# Patient Record
Sex: FEMALE | Race: WHITE | Employment: PART TIME | URBAN - NONMETROPOLITAN AREA
[De-identification: names, ages, dates, MRNs, and addresses within clinical notes are randomized per-mention and may not be internally consistent; named-entity substitution may affect disease eponyms.]

---
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
## 2020-07-13 ENCOUNTER — VIRTUAL VISIT (OUTPATIENT)
Dept: FAMILY MEDICINE | Facility: OTHER | Age: 20
End: 2020-07-13
Attending: PHYSICIAN ASSISTANT
Payer: COMMERCIAL

## 2020-07-13 DIAGNOSIS — J02.0 STREPTOCOCCAL SORE THROAT: ICD-10-CM

## 2020-07-13 DIAGNOSIS — R51.9 ACUTE NONINTRACTABLE HEADACHE, UNSPECIFIED HEADACHE TYPE: ICD-10-CM

## 2020-07-13 DIAGNOSIS — Z20.822 EXPOSURE TO COVID-19 VIRUS: Primary | ICD-10-CM

## 2020-07-13 DIAGNOSIS — Z20.822 SUSPECTED COVID-19 VIRUS INFECTION: Primary | ICD-10-CM

## 2020-07-13 PROCEDURE — 99213 OFFICE O/P EST LOW 20 MIN: CPT | Mod: TEL | Performed by: PHYSICIAN ASSISTANT

## 2020-07-13 PROCEDURE — U0003 INFECTIOUS AGENT DETECTION BY NUCLEIC ACID (DNA OR RNA); SEVERE ACUTE RESPIRATORY SYNDROME CORONAVIRUS 2 (SARS-COV-2) (CORONAVIRUS DISEASE [COVID-19]), AMPLIFIED PROBE TECHNIQUE, MAKING USE OF HIGH THROUGHPUT TECHNOLOGIES AS DESCRIBED BY CMS-2020-01-R: HCPCS | Mod: ZL | Performed by: PHYSICIAN ASSISTANT

## 2020-07-13 PROCEDURE — 99207 ZZC NO CHARGE NURSE ONLY: CPT

## 2020-07-13 PROCEDURE — C9803 HOPD COVID-19 SPEC COLLECT: HCPCS

## 2020-07-13 NOTE — PATIENT INSTRUCTIONS
"Discharge Instructions for COVID-19 Patients  You have--or may have--COVID-19. Please follow the instructions listed below.   If you have a weakened immune system, discuss with your doctor any other actions you need to take.  How can I protect others?  If you have symptoms (fever, cough, body aches or trouble breathing):    Stay home and away from others (self-isolate) until:  ? At least 10 days have passed since your symptoms started. And   ? You've had no fever--and no medicine that reduces fever--for 3 full days (72 hours). And   ? Your other symptoms have resolved (gotten better).  If you don't show symptoms, but testing showed that you have COVID-19:    Stay home and away from others (self-isolate) until at least 10 days have passed since the date of your first positive COVID-19 test.  During this time    Stay in your own room, even for meals. Use your own bathroom if you can.    Stay away from others in your home. No hugging, kissing or shaking hands. No visitors.    Don't go to work, school or anywhere else.    Clean \"high touch\" surfaces often (doorknobs, counters, handles). Use household cleaning spray or wipes. You'll find a full list of  on the EPA website: www.epa.gov/pesticide-registration/list-n-disinfectants-use-against-sars-cov-2.    Cover your mouth and nose with a mask, tissue or wash cloth to avoid spreading germs.    Wash your hands and face often. Use soap and water.    Caregivers in these groups are at risk for severe illness due to COVID-19:  ? People 65 years and older  ? People who live in a nursing home or long-term care facility  ? People with chronic disease (lung, heart, cancer, diabetes, kidney, liver, immunologic)  ? People who have a weakened immune system, including those who:    Are in cancer treatment    Take medicine that weakens the immune system, such as corticosteroids    Had a bone marrow or organ transplant    Have an immune deficiency    Have poorly controlled HIV or " AIDS    Are obese (body mass index of 40 or higher)    Smoke regularly    Caregivers should wear gloves while washing dishes, handling laundry and cleaning bedrooms and bathrooms.    Use caution when washing and drying laundry: Don't shake dirty laundry and use the warmest water setting that you can.    For more tips on managing your health at home, go to www.cdc.gov/coronavirus/2019-ncov/downloads/10Things.pdf.  How can I take care of myself at home?  1. Get lots of rest. Drink extra fluids (unless a doctor has told you not to).  2. Take Tylenol (acetaminophen) for fever or pain. If you have liver or kidney problems, ask your family doctor if it's okay to take Tylenol.     Adults can take either:  ? 650 mg (two 325 mg pills) every 4 to 6 hours, or   ? 1,000 mg (two 500 mg pills) every 8 hours as needed.  ? Note: Don't take more than 3,000 mg in one day. Acetaminophen is found in many medicines (both prescribed and over-the-counter medicines). Read all labels to be sure you don't take too much.   For children, check the Tylenol bottle for the right dose. The dose is based on the child's age or weight.  3. If you have other health problems (like cancer, heart failure, an organ transplant or severe kidney disease): Call your specialty clinic if you don't feel better in the next 2 days.  4. Know when to call 911. Emergency warning signs include:  ? Trouble breathing or shortness of breath  ? Pain or pressure in the chest that doesn't go away  ? Feeling confused like you haven't felt before, or not being able to wake up  ? Bluish-colored lips or face  5. Your doctor may have prescribed a blood thinner medicine. Follow their instructions.  Where can I get more information?     [employer] [place] - About COVID-19:   [email or web address]    CDC - What to Do If You're Sick: www.cdc.gov/coronavirus/2019-ncov/about/steps-when-sick.html    CDC - Ending Home Isolation:  www.cdc.gov/coronavirus/2019-ncov/hcp/disposition-in-home-patients.html    CDC - Caring for Someone: www.cdc.gov/coronavirus/2019-ncov/if-you-are-sick/care-for-someone.html    [facility] - Interim Guidance for Hospital Discharge to Home: www.[facility].[place]/diseases/coronavirus/hcp/hospdischarge.pdf    [facility] clinical trials (COVID-19 research studies): clinicalaffairs.[facility]-clinical-trials    Below are the COVID-19 hotlines at the [place] Department of Health ([facility]). Interpreters are available.  ? For health questions: Call 271-446-3859 or 1-402.820.8752 (7 a.m. to 7 p.m.)  ? For questions about schools and childcare: Call 817-250-1357 or 1-714.706.6276 (7 a.m. to 7 p.m.)    For informational purposes only. Not to replace the advice of your health care provider. Clinically reviewed by the Infection Prevention Team.Copyright   2020 [facility]. All rights reserved. [place] [id number] - 06/20.

## 2020-07-13 NOTE — NURSING NOTE
Patient was around a person on Tuesday that was positive for COVID.  Bita Rosado LPN ....................  7/13/2020   12:21 PM

## 2020-07-13 NOTE — PROGRESS NOTES
"Rip Lyon is a 20 year old female who is being evaluated via a billable telephone visit.      The patient has been notified of following:     \"This telephone visit will be conducted via a call between you and your physician/provider. We have found that certain health care needs can be provided without the need for a physical exam.  This service lets us provide the care you need with a short phone conversation.  If a prescription is necessary we can send it directly to your pharmacy.  If lab work is needed we can place an order for that and you can then stop by our lab to have the test done at a later time.    Telephone visits are billed at different rates depending on your insurance coverage. During this emergency period, for some insurers they may be billed the same as an in-person visit.  Please reach out to your insurance provider with any questions.    If during the course of the call the physician/provider feels a telephone visit is not appropriate, you will not be charged for this service.\"    Patient has given verbal consent for Telephone visit?  Yes    What phone number would you like to be contacted at? [phone]    How would you like to obtain your AVS? Mail a copy    Subjective     Rip Lyon is a 20 year old female who presents via phone visit today for the following health issues:    HPI  Patient is contacted via telephone for consideration of COVID-19 testing. Patient states she was in close contact with a friend for an extended period of time earlier this last week. She then developed   a headache and sore throat Wednesday. She has not been taking anything for symptomatic relief. No history of pulmonary disease. Non smoker.  Denies fever, chills, sweats, cough, shortness of breath, wheezing, muscle or joint aches, GI symptoms, rash.     PAST MEDICAL HISTORY:   Past Medical History:   Diagnosis Date     Otitis media     2001,2006,2007, with otorrhea, treated with azithromycin, with " amoxicillin.       PAST SURGICAL HISTORY:   Past Surgical History:   Procedure Laterality Date     MYRINGOTOMY, INSERT TUBE, COMBINED      No Comments Provided       FAMILY HISTORY: History reviewed. No pertinent family history.    SOCIAL HISTORY:   Social History     Tobacco Use     Smoking status: Never Smoker     Smokeless tobacco: Never Used   Substance Use Topics     Alcohol use: No      No Known Allergies  No current outpatient medications on file.     No current facility-administered medications for this visit.          Reviewed and updated as needed this visit by Provider  Tobacco  Allergies  Meds  Problems  Med Hx  Surg Hx  Fam Hx         Review of Systems   Constitutional, HEENT, cardiovascular, pulmonary, gi and gu systems are negative, except as otherwise noted.       Objective   Reported vitals:  There were no vitals taken for this visit.   healthy, alert and no distress  PSYCH: Alert and oriented times 3; coherent speech, normal   rate and volume, able to articulate logical thoughts, able   to abstract reason, no tangential thoughts, no hallucinations   or delusions  Her affect is normal  RESP: No cough, no audible wheezing, able to talk in full sentences  Remainder of exam unable to be completed due to telephone visits            Assessment/Plan:  1. Exposure to COVID-19 virus    2. Acute nonintractable headache, unspecified headache type    3. Streptococcal sore throat      Patient meets criteria for COVID-19 testing. They are informed to self quarantine until results are available. They have been provided information to complete curbside testing. Will notify with results. If positive, patient is to self-quarantine at home for 14 days. If symptoms worsen, they were told they need to have further re-evaluation. Encouraged symptomatic management.       Phone call duration:  5 minutes    Angelique Crooks PA-C

## 2020-07-14 LAB
SARS-COV-2 RNA SPEC QL NAA+PROBE: NOT DETECTED
SPECIMEN SOURCE: NORMAL

## 2021-05-13 ENCOUNTER — PATIENT OUTREACH (OUTPATIENT)
Dept: FAMILY MEDICINE | Facility: OTHER | Age: 21
End: 2021-05-13

## 2021-05-13 NOTE — LETTER
[facility]  [address]  [place] [id number] [phone]       May 13, 2021    Rip Lyon  [address]  [place] [address]    Dear Rip,    We care about your health and have reviewed your health plan and are making recommendations based on this review, to optimize your health.    You are in particular need of attention regarding:  -Cervical Cancer Screening    We are recommending that you:  -schedule a PAP SMEAR EXAM which is due.  Please disregard this reminder if you have had this exam elsewhere within the last year.  It would be helpful for us to have a copy of your recent pap smear report in our file so that we can best coordinate your care.    If you are under/uninsured, we recommend you contact the [employer] [facility]. They offer pap smears at no charge or on a sliding fee charge. You can schedule with them at [phone]. Please have them send us the results.      In addition, here is a list of due or overdue Health Maintenance reminders.    Health Maintenance Due   Topic Date Due     Preventive Care Visit  Never done     Chlamydia Screening  Never done     Discuss Advance Care Planning  Never done     Diptheria Tetanus Pertussis (DTAP/TDAP/TD) Vaccine (1 - Tdap) Never done     HPV Vaccine (1 - 2-dose series) Never done     HIV Screening  Never done     COVID-19 Vaccine (1) Never done     Hepatitis C Screening  Never done     PHQ-2  01/01/2021     PAP Smear  04/21/2021       To address the above recommendations, we encourage you to contact us at [phone], via [employer]nb or by contacting Central Scheduling toll free at [phone] 24 hours a day. They will assist you with finding the most convenient time and location.    Thank you for trusting [facility] and we appreciate the opportunity to serve you.  We look forward to supporting your healthcare needs in the future.    Healthy Regards,    Your [facility] Team

## 2021-05-13 NOTE — TELEPHONE ENCOUNTER
Patient Quality Outreach      Summary:    Patient has the following on her problem list/HM: None    Patient is due/failing the following:   Cervical Cancer Screening - PAP Needed    Type of outreach:    Sent letter.    Questions for provider review:    None                                                                                                                                          Chart routed to Care Team.

## 2021-07-06 ENCOUNTER — HOSPITAL ENCOUNTER (EMERGENCY)
Facility: OTHER | Age: 21
Discharge: HOME | End: 2021-07-06
Attending: PHYSICIAN ASSISTANT | Admitting: PHYSICIAN ASSISTANT
Payer: COMMERCIAL

## 2021-07-06 VITALS
WEIGHT: 166 LBS | SYSTOLIC BLOOD PRESSURE: 115 MMHG | TEMPERATURE: 98.2 F | BODY MASS INDEX: 25.43 KG/M2 | RESPIRATION RATE: 16 BRPM | OXYGEN SATURATION: 100 % | DIASTOLIC BLOOD PRESSURE: 73 MMHG | HEART RATE: 80 BPM

## 2021-07-06 DIAGNOSIS — R19.5 LOOSE STOOLS: ICD-10-CM

## 2021-07-06 DIAGNOSIS — F41.1 ANXIETY REACTION: ICD-10-CM

## 2021-07-06 DIAGNOSIS — E83.42 HYPOMAGNESEMIA: ICD-10-CM

## 2021-07-06 LAB
ALBUMIN SERPL-MCNC: 4.7 G/DL (ref 3.5–5.7)
ALBUMIN UR-MCNC: NEGATIVE MG/DL
ALP SERPL-CCNC: 51 U/L (ref 34–104)
ALT SERPL W P-5'-P-CCNC: 11 U/L (ref 7–52)
ANION GAP SERPL CALCULATED.3IONS-SCNC: 10 MMOL/L (ref 3–14)
APPEARANCE UR: CLEAR
AST SERPL W P-5'-P-CCNC: 15 U/L (ref 13–39)
BACTERIA #/AREA URNS HPF: ABNORMAL /HPF
BASOPHILS # BLD AUTO: 0 10E9/L (ref 0–0.2)
BASOPHILS NFR BLD AUTO: 0.8 %
BILIRUB SERPL-MCNC: 0.6 MG/DL (ref 0.3–1)
BILIRUB UR QL STRIP: NEGATIVE
BUN SERPL-MCNC: 10 MG/DL (ref 7–25)
CALCIUM SERPL-MCNC: 9.5 MG/DL (ref 8.6–10.3)
CHLORIDE SERPL-SCNC: 106 MMOL/L (ref 98–107)
CO2 SERPL-SCNC: 24 MMOL/L (ref 21–31)
COLOR UR AUTO: YELLOW
CREAT SERPL-MCNC: 0.86 MG/DL (ref 0.6–1.2)
D DIMER PPP FEU-MCNC: 0.4 UG/ML FEU (ref 0–0.5)
DIFFERENTIAL METHOD BLD: NORMAL
EOSINOPHIL # BLD AUTO: 0.1 10E9/L (ref 0–0.7)
EOSINOPHIL NFR BLD AUTO: 2.7 %
ERYTHROCYTE [DISTWIDTH] IN BLOOD BY AUTOMATED COUNT: 12.6 % (ref 10–15)
GFR SERPL CREATININE-BSD FRML MDRD: 83 ML/MIN/{1.73_M2}
GLUCOSE SERPL-MCNC: 114 MG/DL (ref 70–105)
GLUCOSE UR STRIP-MCNC: NEGATIVE MG/DL
HCG UR QL: NEGATIVE
HCT VFR BLD AUTO: 42.3 % (ref 35–47)
HGB BLD-MCNC: 14.1 G/DL (ref 11.7–15.7)
HGB UR QL STRIP: ABNORMAL
IMM GRANULOCYTES # BLD: 0 10E9/L (ref 0–0.4)
IMM GRANULOCYTES NFR BLD: 0.4 %
KETONES UR STRIP-MCNC: 5 MG/DL
LEUKOCYTE ESTERASE UR QL STRIP: NEGATIVE
LYMPHOCYTES # BLD AUTO: 1 10E9/L (ref 0.8–5.3)
LYMPHOCYTES NFR BLD AUTO: 20.3 %
MAGNESIUM SERPL-MCNC: 1.9 MG/DL (ref 1.9–2.7)
MCH RBC QN AUTO: 29.7 PG (ref 26.5–33)
MCHC RBC AUTO-ENTMCNC: 33.3 G/DL (ref 31.5–36.5)
MCV RBC AUTO: 89 FL (ref 78–100)
MONOCYTES # BLD AUTO: 0.5 10E9/L (ref 0–1.3)
MONOCYTES NFR BLD AUTO: 10.9 %
NEUTROPHILS # BLD AUTO: 3.1 10E9/L (ref 1.6–8.3)
NEUTROPHILS NFR BLD AUTO: 64.9 %
NITRATE UR QL: NEGATIVE
PH UR STRIP: 7 PH (ref 5–7)
PLATELET # BLD AUTO: 257 10E9/L (ref 150–450)
POTASSIUM SERPL-SCNC: 3.2 MMOL/L (ref 3.5–5.1)
PROT SERPL-MCNC: 7.1 G/DL (ref 6.4–8.9)
RBC # BLD AUTO: 4.74 10E12/L (ref 3.8–5.2)
RBC #/AREA URNS AUTO: 4 /HPF (ref 0–2)
SODIUM SERPL-SCNC: 140 MMOL/L (ref 134–144)
SOURCE: ABNORMAL
SP GR UR STRIP: 1.01 (ref 1–1.03)
TROPONIN I SERPL-MCNC: <2.3 PG/ML
TSH SERPL DL<=0.05 MIU/L-ACNC: 2.14 IU/ML (ref 0.34–5.6)
UROBILINOGEN UR STRIP-MCNC: NORMAL MG/DL (ref 0–2)
WBC # BLD AUTO: 4.8 10E9/L (ref 4–11)
WBC #/AREA URNS AUTO: <1 /HPF (ref 0–5)

## 2021-07-06 PROCEDURE — 84484 ASSAY OF TROPONIN QUANT: CPT | Performed by: PHYSICIAN ASSISTANT

## 2021-07-06 PROCEDURE — 96375 TX/PRO/DX INJ NEW DRUG ADDON: CPT | Performed by: PHYSICIAN ASSISTANT

## 2021-07-06 PROCEDURE — 84443 ASSAY THYROID STIM HORMONE: CPT | Performed by: PHYSICIAN ASSISTANT

## 2021-07-06 PROCEDURE — 250N000013 HC RX MED GY IP 250 OP 250 PS 637: Performed by: PHYSICIAN ASSISTANT

## 2021-07-06 PROCEDURE — 258N000003 HC RX IP 258 OP 636: Performed by: PHYSICIAN ASSISTANT

## 2021-07-06 PROCEDURE — 96361 HYDRATE IV INFUSION ADD-ON: CPT | Performed by: PHYSICIAN ASSISTANT

## 2021-07-06 PROCEDURE — 80053 COMPREHEN METABOLIC PANEL: CPT | Performed by: PHYSICIAN ASSISTANT

## 2021-07-06 PROCEDURE — 93010 ELECTROCARDIOGRAM REPORT: CPT | Performed by: INTERNAL MEDICINE

## 2021-07-06 PROCEDURE — 96374 THER/PROPH/DIAG INJ IV PUSH: CPT | Performed by: PHYSICIAN ASSISTANT

## 2021-07-06 PROCEDURE — 81001 URINALYSIS AUTO W/SCOPE: CPT | Performed by: PHYSICIAN ASSISTANT

## 2021-07-06 PROCEDURE — 85379 FIBRIN DEGRADATION QUANT: CPT | Performed by: PHYSICIAN ASSISTANT

## 2021-07-06 PROCEDURE — 99283 EMERGENCY DEPT VISIT LOW MDM: CPT | Performed by: PHYSICIAN ASSISTANT

## 2021-07-06 PROCEDURE — 85025 COMPLETE CBC W/AUTO DIFF WBC: CPT | Performed by: PHYSICIAN ASSISTANT

## 2021-07-06 PROCEDURE — 99284 EMERGENCY DEPT VISIT MOD MDM: CPT | Mod: 25 | Performed by: PHYSICIAN ASSISTANT

## 2021-07-06 PROCEDURE — 250N000011 HC RX IP 250 OP 636: Performed by: PHYSICIAN ASSISTANT

## 2021-07-06 PROCEDURE — 81025 URINE PREGNANCY TEST: CPT | Performed by: PHYSICIAN ASSISTANT

## 2021-07-06 PROCEDURE — 83735 ASSAY OF MAGNESIUM: CPT | Performed by: PHYSICIAN ASSISTANT

## 2021-07-06 PROCEDURE — 36415 COLL VENOUS BLD VENIPUNCTURE: CPT | Performed by: PHYSICIAN ASSISTANT

## 2021-07-06 PROCEDURE — 93005 ELECTROCARDIOGRAM TRACING: CPT | Performed by: PHYSICIAN ASSISTANT

## 2021-07-06 RX ORDER — LORAZEPAM 2 MG/ML
0.5 INJECTION INTRAMUSCULAR ONCE
Status: COMPLETED | OUTPATIENT
Start: 2021-07-06 | End: 2021-07-06

## 2021-07-06 RX ORDER — ONDANSETRON 2 MG/ML
4 INJECTION INTRAMUSCULAR; INTRAVENOUS EVERY 30 MIN PRN
Status: DISCONTINUED | OUTPATIENT
Start: 2021-07-06 | End: 2021-07-06 | Stop reason: HOSPADM

## 2021-07-06 RX ORDER — MAGNESIUM OXIDE 400 MG/1
800 TABLET ORAL ONCE
Status: COMPLETED | OUTPATIENT
Start: 2021-07-06 | End: 2021-07-06

## 2021-07-06 RX ORDER — SODIUM CHLORIDE 9 MG/ML
INJECTION, SOLUTION INTRAVENOUS CONTINUOUS
Status: DISCONTINUED | OUTPATIENT
Start: 2021-07-06 | End: 2021-07-06 | Stop reason: HOSPADM

## 2021-07-06 RX ADMIN — SODIUM CHLORIDE 1000 ML: 9 INJECTION, SOLUTION INTRAVENOUS at 18:56

## 2021-07-06 RX ADMIN — MAGNESIUM OXIDE TAB 400 MG (241.3 MG ELEMENTAL MG) 800 MG: 400 (241.3 MG) TAB at 20:03

## 2021-07-06 RX ADMIN — ONDANSETRON 4 MG: 2 INJECTION INTRAMUSCULAR; INTRAVENOUS at 18:57

## 2021-07-06 RX ADMIN — LORAZEPAM 0.5 MG: 2 INJECTION, SOLUTION INTRAMUSCULAR; INTRAVENOUS at 18:56

## 2021-07-06 ASSESSMENT — ENCOUNTER SYMPTOMS
DIARRHEA: 1
BRUISES/BLEEDS EASILY: 0
FEVER: 0
SHORTNESS OF BREATH: 0
HEMATURIA: 0
CONFUSION: 0
NERVOUS/ANXIOUS: 1
NAUSEA: 0
ADENOPATHY: 0
ABDOMINAL PAIN: 0
VOMITING: 0
CHEST TIGHTNESS: 0
BACK PAIN: 0
WOUND: 0
CHILLS: 0
CONSTIPATION: 0

## 2021-07-06 NOTE — ED TRIAGE NOTES
ED Nursing Triage Note (General)   ________________________________    Sukhdeepky Lyon is a 21 year old Female that presents to triage via private vehicle with complaints of fatigue.  Patient states earlier today she was feeling weak/fatigued so she took a nap and after she woke up she began having diarrhea as well as a tingling feeling in her hands/knees and a tight feeling in her chest.  Patient denies hx of anxiety or cardiac dx. Patient denies worsening symptoms since onset, however, states symptoms are not resolving.    Significant symptoms had onset 5 hours ago.  GCS-15  Airway: intact  Breathing noted as Normal  Action taken: 4      PRE [facility]IV[facility] SITUATION-home

## 2021-07-07 NOTE — ED PROVIDER NOTES
History     Chief Complaint   Patient presents with     Tingling     Diarrhea     HPI  Rip Lyon is a 21 year old female who reports that she has been feeling fatigued and weak over the past few days.  She went on to take a nap and when she woke up she had significant amount of loose stool.  She denies any anxiety but she reports her hands became tingly and she felt her knees were weak and she had some chest tightness.  She feels most of her symptoms have been gradually improving while waiting in the waiting room.  Denies any other issues at this time.    Allergies:  No Known Allergies    Problem List:    There are no active problems to display for this patient.       Past Medical History:    Past Medical History:   Diagnosis Date     Otitis media        Past Surgical History:    Past Surgical History:   Procedure Laterality Date     MYRINGOTOMY, INSERT TUBE, COMBINED      No Comments Provided       Family History:    No family history on file.    Social History:  Marital Status:  Single [1]  Social History     Tobacco Use     Smoking status: Never Smoker     Smokeless tobacco: Never Used   Substance Use Topics     Alcohol use: No     Drug use: Not Currently     Comment: Drug use: No        Medications:    No current outpatient medications on file.        Review of Systems   Constitutional: Negative for chills and fever.   HENT: Negative for congestion and drooling.    Eyes: Negative for visual disturbance.   Respiratory: Negative for chest tightness and shortness of breath.    Cardiovascular: Negative for chest pain.   Gastrointestinal: Positive for diarrhea. Negative for abdominal pain, constipation, nausea and vomiting.   Genitourinary: Negative for hematuria.   Musculoskeletal: Negative for back pain.   Skin: Negative for rash and wound.   Neurological: Negative for syncope.   Hematological: Negative for adenopathy. Does not bruise/bleed easily.   Psychiatric/Behavioral: Negative for confusion. The  patient is nervous/anxious.    All other systems reviewed and are negative.      Physical Exam   BP: 115/73  Pulse: 80  Temp: 98.2  F (36.8  C)  Resp: 16  Weight: 75.3 kg (166 lb)  SpO2: 100 %      Physical Exam  Vitals signs and nursing note reviewed.   Constitutional:       General: She is not in acute distress.     Appearance: She is well-developed. She is not diaphoretic.   HENT:      Head: Normocephalic and atraumatic.   Eyes:      General: No scleral icterus.     Conjunctiva/sclera: Conjunctivae normal.   Neck:      Musculoskeletal: Neck supple.   Cardiovascular:      Rate and Rhythm: Normal rate and regular rhythm.   Pulmonary:      Effort: Pulmonary effort is normal.      Breath sounds: Normal breath sounds.      Comments: Lung sounds are clear.  SaO2 is 100% on room air.  She is not appear to be in any respiratory distress.  No tachypnea.  Abdominal:      General: Abdomen is flat. Bowel sounds are normal.      Palpations: Abdomen is soft. There is no mass.      Tenderness: There is no abdominal tenderness.      Comments: Abdomen is soft nontender no rebound or masses.  Bowel sounds are normal.   Musculoskeletal:         General: No deformity.   Lymphadenopathy:      Cervical: No cervical adenopathy.   Skin:     General: Skin is warm and dry.      Findings: No rash.   Neurological:      Mental Status: She is alert and oriented to person, place, and time. Mental status is at baseline.   Psychiatric:         Mood and Affect: Mood normal.         Behavior: Behavior normal.         ED Course     EKG shows normal sinus rhythm with sinus arrhythmia heart rate of 72.    Results for orders placed or performed during the hospital encounter of 07/06/21 (from the past 24 hour(s))   CBC with platelets differential   Result Value Ref Range    WBC 4.8 4.0 - 11.0 10e9/L    RBC Count 4.74 3.8 - 5.2 10e12/L    Hemoglobin 14.1 11.7 - 15.7 g/dL    Hematocrit 42.3 35.0 - 47.0 %    MCV 89 78 - 100 fl    MCH 29.7 26.5 - 33.0 pg     MCHC 33.3 31.5 - 36.5 g/dL    RDW 12.6 10.0 - 15.0 %    Platelet Count 257 150 - 450 10e9/L    Diff Method Automated Method     % Neutrophils 64.9 %    % Lymphocytes 20.3 %    % Monocytes 10.9 %    % Eosinophils 2.7 %    % Basophils 0.8 %    % Immature Granulocytes 0.4 %    Absolute Neutrophil 3.1 1.6 - 8.3 10e9/L    Absolute Lymphocytes 1.0 0.8 - 5.3 10e9/L    Absolute Monocytes 0.5 0.0 - 1.3 10e9/L    Absolute Eosinophils 0.1 0.0 - 0.7 10e9/L    Absolute Basophils 0.0 0.0 - 0.2 10e9/L    Abs Immature Granulocytes 0.0 0 - 0.4 10e9/L   D dimer quantitative   Result Value Ref Range    D Dimer 0.4 0.0 - 0.50 ug/ml FEU   Comprehensive metabolic panel   Result Value Ref Range    Sodium 140 134 - 144 mmol/L    Potassium 3.2 (L) 3.5 - 5.1 mmol/L    Chloride 106 98 - 107 mmol/L    Carbon Dioxide 24 21 - 31 mmol/L    Anion Gap 10 3 - 14 mmol/L    Glucose 114 (H) 70 - 105 mg/dL    Urea Nitrogen 10 7 - 25 mg/dL    Creatinine 0.86 0.60 - 1.20 mg/dL    GFR Estimate 83 >60 mL/min/[1.73_m2]    GFR Estimate If Black >90 >60 mL/min/[1.73_m2]    Calcium 9.5 8.6 - 10.3 mg/dL    Bilirubin Total 0.6 0.3 - 1.0 mg/dL    Albumin 4.7 3.5 - 5.7 g/dL    Protein Total 7.1 6.4 - 8.9 g/dL    Alkaline Phosphatase 51 34 - 104 U/L    ALT 11 7 - 52 U/L    AST 15 13 - 39 U/L   TSH Reflex GH   Result Value Ref Range    TSH Reflex 2.14 0.34 - 5.60 IU/mL   Troponin GH   Result Value Ref Range    Troponin <2.3 <34.0 pg/mL   Magnesium   Result Value Ref Range    Magnesium 1.9 1.9 - 2.7 mg/dL   UA reflex to Microscopic   Result Value Ref Range    Color Urine Yellow     Appearance Urine Clear     Glucose Urine Negative NEG^Negative mg/dL    Bilirubin Urine Negative NEG^Negative    Ketones Urine 5 (A) NEG^Negative mg/dL    Specific Gravity Urine 1.011 1.003 - 1.035    Blood Urine Moderate (A) NEG^Negative    pH Urine 7.0 5.0 - 7.0 pH    Protein Albumin Urine Negative NEG^Negative mg/dL    Urobilinogen mg/dL Normal 0.0 - 2.0 mg/dL    Nitrite Urine  Negative NEG^Negative    Leukocyte Esterase Urine Negative NEG^Negative    Source Midstream Urine     RBC Urine 4 (H) 0 - 2 /HPF    WBC Urine <1 0 - 5 /HPF    Bacteria Urine Few (A) NEG^Negative /HPF   HCG qualitative urine   Result Value Ref Range    HCG Qual Urine Negative NEG^Negative       Medications   0.9% sodium chloride BOLUS (0 mLs Intravenous Stopped 7/6/21 1959)     Followed by   sodium chloride 0.9% infusion (has no administration in time range)   ondansetron (ZOFRAN) injection 4 mg (4 mg Intravenous Given 7/6/21 1857)   LORazepam (ATIVAN) injection 0.5 mg (0.5 mg Intravenous Given 7/6/21 1856)   magnesium oxide (MAG-OX) tablet 800 mg (800 mg Oral Given 7/6/21 2003)       Assessments & Plan (with Medical Decision Making)     I have reviewed the nursing notes.    I have reviewed the findings, diagnosis, plan and need for follow up with the patient.      There are no discharge medications for this patient.      Final diagnoses:   Anxiety reaction   Hypomagnesemia   Loose stools   Afebrile.  Vital signs stable.  Patient with episode of shakiness after having a loose stool.  Does admit to a minor history of some anxiety but not currently on medication for this.  Feels weak and fatigued.  IV established and she was given fluids and Ativan as well as Zofran initially.  EKG shows normal sinus rhythm with sinus arrhythmia heart rate of 72.  Troponin is normal.  D-dimer is normal.  TSH is normal.  CBC shows normal white blood cells no left shift.  CMP is unremarkable except for slightly decreased potassium at 3.2.  Her magnesium returns decreased at 1.9 she was given oral replacement.  Her UA shows some blood in the urine but no signs of UTI she does admit that she just started her period.  hCG is negative.  She is feeling much better after the above treatment and feels ready to return home.  I discussed increase fluid intake and continued monitoring.  I discussed increasing her magnesium through her diet.   Follow-up if there is any concern for further evaluation as needed.      7/6/2021   [facility]     Chas Bliss PA-C  07/06/21 2047

## 2021-07-08 LAB — INTERPRETATION ECG - MUSE: NORMAL

## 2023-09-16 NOTE — PATIENT INSTRUCTIONS
Patient Information     Patient Name MRRip Reynoso [id number] Female 2000      Patient Instructions by Olive Antunez MD at 2017 10:18 AM     Author:  Olive Antunez MD  Service:  (none) Author Type:  Physician     Filed:  2017 10:19 AM  Encounter Date:  2017 Status:  Addendum     :  Olive Antunez MD (Physician)        Related Notes: Original Note by Olive Antunez MD (Physician) filed at 2017 10:18 AM            Car Safety tips:   Wear your seatbelt at all times.   Do not drive when tired.  If drinking alcohol, find a designated .  Do NOT EVER text and drive!   Consider DRIVE MODE cristian for your phone.   Car Safety tips:   Wear your seatbelt at all times.   Do not drive when tired.  If drinking alcohol, find a designated .  Do NOT EVER text and drive!   Consider DRIVE MODE cristian for your phone.       Consider second dose of HPV and menactra ( handouts provided by nursing staff )     Stretch out before exercise     Index Israeli   Heat Illness   ________________________________________________________________________  KEY POINTS    Heat illnesses such heat cramps, heat exhaustion, and heatstroke happen when your body gets overheated, usually when you are active in very hot or humid weather.    To treat heat cramps or heat exhaustion, stop the activity, drink a lot of cool water, clear juice, or a sports drink. Get out of the sun cool your body with a fan, spray, cool moist washcloth, or cool shower.    Get medical attention if the symptoms get worse or last longer. Heatstroke is an emergency and needs to be treated at the hospital.  ________________________________________________________________________  What is heat illness?   Heat illness happens when your body gets overheated, usually when you are active in very hot or humid weather. Heat illnesses include heat cramps, heat exhaustion, and heatstroke, the most  Silva Montoya(Resident) serious type of heat illness. You are at high risk for heat illness if you:    Are an older adult    Are overweight    Have heart disease    Have high blood pressure or a chronic illness    Work in a hot place  The medical term for heat illness is hyperthermia.  What is the cause?  Heat illness usually happens after long exposure to hot temperatures. It can also be caused by being sick with a high fever or exercising too hard. Overdressing, eating too much, not drinking enough fluids, or drinking too much alcohol can make it more likely that you will get overheated.  When your body gets hot, it has ways of cooling itself. For example, when you sweat, the moisture on your skin evaporates and cools your skin. When the temperature is too hot or when there is too much humidity, sweating may no longer cool your body enough to keep your temperature from rising too high.  What are the symptoms?   As your body gets hotter and is unable to cool down, your symptoms will get worse. First, you may lose too much fluid and have heat cramps. Without treatment, your symptoms can get worse and you may have heat exhaustion or heatstroke.  Symptoms may include:  Heat cramps:     Muscle cramps, most often in your belly, arm, or leg muscles    Muscles that feel hard and tense    Mild swelling of your feet or hands    A red, raised rash called heat rash or prickly heat  Heat exhaustion:     Dizziness    Weakness    Nausea or vomiting    Confusion    Muscle cramps    Headaches    Heavy sweating  Heatstroke:     Little sweating or urination    Confusion or loss of consciousness    Shortness of breath    Nervousness    Seizures    Coma  Heatstroke is a life-threatening condition in which your body temperature rises quickly to 104 F (40 C) or higher and your body's ability to control your temperature breaks down. Heatstroke may cause damage to your kidneys, heart, lungs, muscles, liver, intestines, and brain.   How is it treated?   To treat  heat cramps stop the activity, drink a lot of fluids, and massage and stretch cramped muscles. Heat cramps may get better more quickly if you drink a sports drink that contains salt and other electrolytes rather than just plain water.   The treatment of heat exhaustion depends on what symptoms you have and how bad they are. It s important to treat heat exhaustion right away to keep symptoms from getting worse. You may need to do some or all of the following:    Stop exercising or any other activity. Loosen your clothing.    Get out of the sun and go into a cool place, preferably with air conditioning.    Cool your body with a fan, spray, cool moist washcloth, or cool bath or shower.    Drink plenty of cool water, clear juice, or a sports drink. Don t drink iced drinks or alcohol. Heat cramps may get better more quickly if you drink a sports drink that contains salt and other electrolytes rather than just plain water.    Massage and stretch cramping muscles.    Lie down and rest.  Get medical attention if the symptoms get worse or last longer. You may need to stay out of the heat and avoid strenuous activity for a few days if you have heat exhaustion.  Heatstroke is an emergency and needs to be treated at the hospital. Follow the treatment for heat exhaustion until medical help arrives.   How can I help prevent heat illness?     Avoid strenuous activity in hot or humid weather.    Stay out of the hot sun.    Wear a hat if you are working or exercising in the sun, and wear light-colored, loose fitting clothing in hot weather.    Take time to get used to a new climate. If you are not used to the heat, expose yourself to it briefly at first and increase your time in the heat slowly.    Drink plenty of water whenever you spend a lot of time in the sun or in a hot place. Drink extra water when you sweat, even if you are not thirsty. If you are on a diet that limits your fluids or if you take diuretic medicine (water  pills), you may need to be careful about how much you drink. Talk to your healthcare provider about this.    Open windows or use a fan or air conditioner to improve airflow. Try to keep at least 1 room in your home cool. Keep windows covered with curtains, shades, or blinds during the hottest part of the day. Set air conditioners below 80 F (27 C).    If your home stays very hot, visit friends or family in cooler homes. During a heat wave, check with your [facility] s emergency services to find out where there are cool buildings you can go to. For example, you might visit air-conditioned places such as libraries, shopping malls, and theaters.    Eat smaller, lighter meals and limit how much alcohol you drink when it is very hot or when you are not used to a hot climate.    If you take medicines, talk to your healthcare provider to see if any of your medicines make it more likely that you will have problems in the heat.  Developed by [employer].  Adult Advisor 2016.3 published by [employer].  Last modified: 2016-02-01  Last reviewed: 2016-01-31  This content is reviewed periodically and is subject to change as new health information becomes available. The information is intended to inform and educate and is not a replacement for medical evaluation, advice, diagnosis or treatment by a healthcare professional.  References   Adult Advisor 2016.3 Index    Copyright   2016 [employer], a division of [employer] All rights reserved.        Index Related topics   Well Teen Care   Nutrition     Eat together with your family once a day if you can, and don t eat in front of the TV.    Eat a variety of foods and make sure that your diet is healthy. Girls need to make sure that they get enough iron and calcium.    Do not skip meals. Eat 3 meals a day, especially breakfast. If you skip a meal, you may be really hungry later on and overeat. Snack on healthy foods like fresh fruits, vegetables, and lean yogurt.    Drink  more water and fewer energy drinks and sodas.    Try to avoid eating a lot of saturated fat and trans fats. There can be a lot of this unhealthy fat in foods like hamburgers, pizza, chicken nuggets, and French fries.    Balance the calories you eat by doing something physically active for 60 minutes every day.  School  You may find it hard to juggle everything that you need or want to do at school and after school. A mentoring program, teacher, or counselor can serve as a great role model.  Social Skills    Peer pressure can be a big stress. Don't let your friends pressure you into doing something unsafe.    When you get angry, self-statements can be helpful. For example, if you are feeling frustrated with a particular situation, you might say to yourself,  I don t need to prove myself here.  If you feel very angry, leave the situation or go do something physical such as walking, jogging, or bicycling.    You may stress over school or test scores or worry that you are not physically or sexually attractive. Find ways to deal with stress. Talking with a friend, parent, healthcare provider, or counselor about life stresses can help.    Explore new interests and activities, including ways to help others. During your teens you will have many opportunities to think through problems and show responsible decision making.  Sexuality  Your body is maturing sexually. In addition to changes in your body, you may notice changes in the ways you think about things and the way you feel. Be open when your parents make an effort to talk with you about personal things like sex, drugs, and friendships. It can be just as hard for parents to talk about these topics as it is for you. Talk with your parents about sexuality, relationships, and values. Discuss things like modesty and commitment to another person. Remember:    Not having sex is the safest way to prevent pregnancy and sexually transmitted diseases.    Healthy dating  relationships are built on respect and concern. It s OK to say no to sex.  Safety Tips  Car Safety    Always wear a seat belt and make a rule that the car doesn t start until everyone is safely buckled in.    Don t drink alcohol or use drugs. Don t ride in car with a  who has used alcohol or drugs.    Night driving is very difficult for new drivers. Most accidents happen between 9 PM and 2 AM. Don't drive if you are sleepy. This can be as dangerous as driving drunk.    Follow the posted speed limit. The faster you go the less control you have over your car. More than a third of teen driving deaths involve speeding.    Avoid distractions like texting or talking on your cell phone. This can make it much more likely that you will have an accident. Keep both hands on the steering wheel. Eating, changing a playlist or CD, or putting on makeup are other things that you shouldn't do while driving. Taking a minute to pull over when you need to do these things could save your life and the lives of others.    Keep control of your emotions when you are driving. If you get upset or angry when driving, pull over to the side of the road until you feel calmer.  Physical Safety    Avoid sunburn by using sunscreen whenever you are outdoors in the daytime. Choose a sunscreen with a sun protection factor (SPF) of 15 or higher. It should protect against UVA and UVB rays. Don t use sunlamps or tanning booths.    Wear a helmet or protective gear and follow safety rules when you play sports or do high-risk activities, such as rock climbing, skiing, cycling, and snowboarding. Never bike, ski, rollerblade, or skateboard out of control. Stay within your comfort level. Don t take unnecessary risks.    Wear eye protection if you are around dust, flying objects, intense light, or chemicals that could get into your eye. Wear safety gear if you play paintball, racquetball, lacrosse, hockey, or fast-pitch softball.    Use ear protectors when  you are in a loud environment. Noise levels at concerts, where music is often louder than 120 decibels, can damage your ears in 10 minutes. Arena and stadium sporting events and car racing can be just as loud.  Substance Abuse    Most smokers started smoking as teens. There are many reasons you might want to try smoking. You may be trying to find a way to fit in with a group of friends, or think it s a fun activity at parties. You may think it will help you relax. You may do it as a way to rebel against parents. None of these reasons outweigh the dangers of addiction, cancer, heart disease, and many others. If you smoke, set a quit date and stop. Ask your healthcare provider for help in quitting.    Don t drink alcohol or abuse drugs, including prescription medicines or diet pills. Don t use steroids to try to improve sports performance. Athletes who test positive for steroids can be suspended or disqualified. Steroids also have many serious side effects.  Bullying  There are different types of bullies. Physical bullies may hit, pinch, kick, shove, bite, or pull a victim s hair. Verbal bullies may insult, start or spread rumors, tease, or make threats. Sexual bullies may make sexual comments, threaten, or touch in unwanted ways, such as snapping a bra strap. Cyber bullies use the Internet, cell phones, or other devices to send or post text or pictures meant to hurt or embarrass another person.  No one should bully and no one deserves to be bullied. If you are being bullied, or are depressed and feel that you could hurt yourself or someone else, talk to a parent, healthcare provider, or another trusted adult.  Reading and Technology  Limit screen time (computers, tablets, TV, videos, cell phones) unrelated to schoolwork to 2 hours a day. Spending a lot of time on the computer takes time away from other activities, like homework, sleep, exercise, and face-to-face time with friends and family. It also increases your  risk for obesity, sleep disorders, and attention problems.  If you are sharing information about yourself on social media, post only information that you want everyone to see and know about you. Many people can see this information, including your parents, your teachers, the police, the college you might want to apply to next year, or the job you might want to apply for in 5 years.   Dental Care  You should brush your teeth at least twice a day and floss once a day. See your dentist for a checkup every 6 months.  Immunizations  Immunizations protect you against several serious, life-threatening diseases. You should get a flu shot every year. Your healthcare provider will let you know if you are up to date on all recommended vaccinations.  Next Visit   You should have a routine checkup every year through the teen years.  Written by Frankie Hinojosa MD, Professor of Clinical Pediatrics, [employer].  Pediatric Advisor 2016.3 published by [employer].  Last modified: 2016-05-11  Last reviewed: 2016-05-11  This content is reviewed periodically and is subject to change as new health information becomes available. The information is intended to inform and educate and is not a replacement for medical evaluation, advice, diagnosis or treatment by a healthcare professional.  References   Pediatric Advisor 2016.3 Index    Copyright   2016 [employer], a division of [employer] All rights reserved.     [facility]: Human Papillomavirus (HPV) Infection and Vaccine: What You Need To Know     Human Papillomavirus  Human papillomavirus (HPV) is the name of a group of viruses that includes more than 100 different types. More than 30 of those are sexually transmitted.  According to the Centers for Disease Control and Prevention, at least 50 percent of sexually active people will get HPV at some time in their lives. Every year, millions of people in the [place] will get  HPV.  Once the virus (germ) infects a cell in the genital area of men and women, it copies itself and keeps infecting healthy cells. When the dead cells shed, you may pass the virus to someone else.   HPV can cause cervical cancer in women. It can cause penile cancer in men.   HPV can also cause anal cancer, throat cancer and genital warts in both men and women.   Symptoms  Most people who have HPV do not have any symptoms and do not know they are infected. The virus may be passed on to others without knowing it.  Some people may get genital warts. These are single or multiple bumps that are found in the genital area. They can also be cauliflower shaped.  How HPV Is Spread  HPV infections are spread by:           vaginal, oral or anal sex           contact with another person s genital warts.  A pregnant woman can pass HPV to her baby during vaginal delivery, but this is rare.  How HPV Is Diagnosed  For women, your health care provider can find the infection during a routine Pap test.   A Pap test is used to find cell changes in the cervix. It can find cervical cancer early when it is easier to treat and cure.   If you do not have signs or symptoms of a cervical problem, and you have no history of abnormal Pap test results, your health care provider will recommend the following schedule for Pap tests:           Ages 20 and younger: no screening needed, regardless of sexual history.           Ages 21 to 29: Pap test every 3 years           Ages 30 to 65:   -   Pap test every 3 years   or  -   Pap test and HPV test every 5 years           Ages 66 and older: no screening needed, regardless of sexual history, if you have had:  -   negative screenings for several years  -   no history of severe dysplasia, uterine or cervical cancer.  If you have signs or symptoms of a cervical problem or are at a higher risk for cervical changes, your health care provider may recommend a different schedule.  Men with risk factors for  anal HPV can be tested.  How to Treat HPV  There is no treatment to make HPV go away. Most of the time, your immune system fights off the virus to make it go away on its own.   If HPV causes genital warts or dysplasia (abnormal cells in the cervix) these both can be treated.  Talk to your health care provider about the best treatment for you.  How To Keep From Getting HPV  One way to protect against getting HPV is to not have any genital contact with another infected person.  If you choose to be sexually active, be involved in a long-term relationship with one partner who is not infected to help prevent HPV infections.  If you choose to be sexually active and not in a long-term relationship:           Limit your sexual partners.           Find out if your partner has ever had a sexually transmitted disease (STD).           Use latex condoms when having any kind of sexual contact.  -   It is currently unknown if condoms prevent HPV.  -   Men and women can get HPV from genital areas that are not covered by a latex condom.  -   It is also possible to get HPV from genital areas that are covered by a condom.  -   Using condoms has been shown to lower the rate of getting cervical cancer and help protect you from other STDs.  HPV Vaccine  The HPV vaccine can prevent most of the cancers caused by HPV if it is given before exposure to the virus.  The HPV vaccine is recommended for boys and girls at age 11 or 12. It may be given starting at age 9. This will help protect them before they are exposed to the virus.   Children  For children ages 9 to 14, the vaccine is given through a series of two injections (shots) spaced 6 to 12 months apart.  Teens and young adults  Teens and young adults who start the HPV vaccine series between ages 15 and 26 will need three doses of the vaccine.  Catch-up vaccinations are recommended for all women ages 13 to 26 and all men ages 13 to 21 who have not completed the series.  The HPV vaccine  may be given to men ages 22 to 26 who have not completed the series.  Teens and young adults, age 15 and older, will receive the:           second dose 1 to 2 months after the first dose           third dose 6 months after the first dose.   2016 [employer]. TM - A TRADEMARK OF [employer]  OTHER TRADEMARKS USED ARE OWNED BY THEIR RESPECTIVE OWNERS  THIS BROCHURE DOES NOT REPLACE MEDICAL OR PROFESSIONAL ADVICE; IT IS ONLY A GUIDE.  -[id number] (11/16)

## 2025-04-29 ENCOUNTER — APPOINTMENT (OUTPATIENT)
Dept: URBAN - METROPOLITAN AREA CLINIC 259 | Age: 25
Setting detail: DERMATOLOGY
End: 2025-05-05

## 2025-04-29 VITALS — HEIGHT: 67 IN | WEIGHT: 180 LBS

## 2025-04-29 DIAGNOSIS — Z71.89 OTHER SPECIFIED COUNSELING: ICD-10-CM

## 2025-04-29 DIAGNOSIS — D18.0 HEMANGIOMA: ICD-10-CM

## 2025-04-29 DIAGNOSIS — L81.4 OTHER MELANIN HYPERPIGMENTATION: ICD-10-CM

## 2025-04-29 DIAGNOSIS — D22 MELANOCYTIC NEVI: ICD-10-CM

## 2025-04-29 DIAGNOSIS — L57.8 OTHER SKIN CHANGES DUE TO CHRONIC EXPOSURE TO NONIONIZING RADIATION: ICD-10-CM

## 2025-04-29 PROBLEM — D22.5 MELANOCYTIC NEVI OF TRUNK: Status: ACTIVE | Noted: 2025-04-29

## 2025-04-29 PROBLEM — D18.01 HEMANGIOMA OF SKIN AND SUBCUTANEOUS TISSUE: Status: ACTIVE | Noted: 2025-04-29

## 2025-04-29 PROCEDURE — OTHER ADDITIONAL NOTES: OTHER

## 2025-04-29 PROCEDURE — OTHER COUNSELING: OTHER

## 2025-04-29 ASSESSMENT — LOCATION SIMPLE DESCRIPTION DERM
LOCATION SIMPLE: LEFT UPPER BACK
LOCATION SIMPLE: CHEST

## 2025-04-29 ASSESSMENT — LOCATION DETAILED DESCRIPTION DERM
LOCATION DETAILED: LEFT SUPERIOR MEDIAL UPPER BACK
LOCATION DETAILED: RIGHT MEDIAL SUPERIOR CHEST
LOCATION DETAILED: LEFT MEDIAL UPPER BACK

## 2025-04-29 ASSESSMENT — LOCATION ZONE DERM: LOCATION ZONE: TRUNK

## 2025-04-29 NOTE — HPI: FULL BODY SKIN EXAMINATION
How Severe Are Your Spot(S)?: mild
What Type Of Note Output Would You Prefer (Optional)?: Standard Output
What Is The Reason For Today's Visit?: Full Body Skin Examination
What Is The Reason For Today's Visit? (Being Monitored For X): concerning skin lesions on an annual basis
Additional History: Patient states that she has one lesion of concern today, located to the middle of her chest. Notes that the lesion is dark brown and has been present for many years, but that it has become more raised in the last year or so. She states that she has never been seen for a full body examination before. Presents for evaluation.\\n

## 2025-04-29 NOTE — PROCEDURE: ADDITIONAL NOTES
Render Risk Assessment In Note?: no
Detail Level: Simple
Additional Notes: Continue to monitor for new or worsening changes such as changes in pigment, increases in size, uneven borders, or bleeding.

## (undated) RX ORDER — ONDANSETRON 2 MG/ML
INJECTION INTRAMUSCULAR; INTRAVENOUS
Status: DISPENSED
Start: 2021-07-06

## (undated) RX ORDER — MAGNESIUM OXIDE 400 MG/1
TABLET ORAL
Status: DISPENSED
Start: 2021-07-06

## (undated) RX ORDER — LORAZEPAM 2 MG/ML
INJECTION INTRAMUSCULAR
Status: DISPENSED
Start: 2021-07-06

## (undated) RX ORDER — SODIUM CHLORIDE 9 MG/ML
INJECTION, SOLUTION INTRAVENOUS
Status: DISPENSED
Start: 2021-07-06